# Patient Record
Sex: MALE | Race: WHITE | NOT HISPANIC OR LATINO | Employment: OTHER | URBAN - METROPOLITAN AREA
[De-identification: names, ages, dates, MRNs, and addresses within clinical notes are randomized per-mention and may not be internally consistent; named-entity substitution may affect disease eponyms.]

---

## 2024-08-08 ENCOUNTER — DOCUMENTATION ONLY (OUTPATIENT)
Dept: TRANSPLANT | Facility: CLINIC | Age: 64
End: 2024-08-08

## 2024-08-08 ENCOUNTER — TELEPHONE (OUTPATIENT)
Dept: TRANSPLANT | Facility: CLINIC | Age: 64
End: 2024-08-08

## 2024-08-08 NOTE — PROGRESS NOTES
"Received this Email from Fan:  \"At this point I would like to donate only to Carlos.  Will you keep us informed on progress on finding a donor or should I talk to Carlos?   If no donor is found for him, I will reconsider the voucher program.\"  Informed Fan we will have him on BACKUP list and will contact him when/if we want to begin testing.    "

## 2024-08-08 NOTE — TELEPHONE ENCOUNTER
"Donor Intake Start:24Donor Intake Complete:24  Expiration Date:10/29/24  Gender:MalePreferred Language:English  Full Name:Fan Ho  Needed:[not answered]  Preferred Name:Valdez  Phone Number:[phone] Phone:  Contact Preference:[not answered]Best Contact Time:11am - 1pm  Emergency Contact:Gretchen Tyler Contact #:[phone]  Relationship to Contact:Contact is my spouse  :62Age:63  Country:[place]  [address] [address]  State:[place]:[id number]  Height:6'0\"Weight:184lbs  BMI:25  Employment Status:RetiredHas PTO for donation?[not answered]  Occupation:[not answered]Requires Heavy Lifting?[not answered]  Education Level:High SchoolMarital Status:  Exercise Routine:2-3/WeekHealth Insurance:  Yes  Blood Type:OEthnicity/Race:White  Donor Type:Directed Donor  Prefer Remote Donation:[not answered]  Physician:Dr. Amandeep [place], [place]  Motivation to donate:  Carlos is a good friend and I want to help him.  Living Donor Pre-Screening  Is In [place]  Yes  Will Accept Blood Transfusions?  Yes  Has been Diagnosed with Kidney Disease?  No  Has had a Heart Attack?  No  Has Diabetes?  No  Has had Cancer?  No  Has had Kidney Stones?  No  Has Used Tobacco  No  Has HIV?  No  Is Currently Incarcerated?  No  Is Currently Residing in [place]  Yes  History Misc  Has Allergies?  Yes  Allergy  Shellfish  Has had Surgeries?  Yes  Surgery When  Shoulder 5 years ago  Takes Medication?  No  Medical History  History of High BP?  Never  Has History Of CABG (bypass surgery)?  No  History of Blood Clots?  Never  History of Coronary Disease?  Never  Has Stents Implanted?  No  Has History of Chest Pain with Exercise?  No  Has History of Chest Pain at Other Times?  No  Results of Climbing 2 Flights of Stairs?No Problem  Has had Stress Test within Last Year?  No  Has had Stroke?  No  Has had Leg Bypass?  No  History of Lung Disease?  Never  History of " COPD?  Never  History of TB?  Never    - Is TB Active?[not answered]  History of Pneumonia?  Never  Has Respiratory Issues?  No  Has Gastro Issues?  No  History of Gallstones?  Never  History of Pancreatitis?  Never  History of Liver Disease?  Never  History of Hepatitis B?  Never    - Is Hep B Active?[not answered]  History of Hepatitis C?  Never  History of Bleeding Problem?  Never  History of UTIs?  No  History of Kidney Damage?  Never  History of Proteinuria?  Never  History of Hematuria?  Never  History of Neuro Disease?  Never  History of Seizure?  Never  History of Lupus?  Never  History of Paralysis?  Never  History of Arthritis?  Never  History of Neuropathy?  Never  History of Depression?  Never  History of Anxiety?  Never  History of Documented Psychiatric Illness?  Never  Has had Transfusions?  No  History of Obesity?  No  History of Fabry's Disease?  No  History of Sickle Cell Disease?  No  History of Sickle Cell Trait?  No  History of Sarcoidosis?  No  History of Auto-Immune Disease  No  Has had Physical Exam?  Yes    - how many years ago:1  Has had PSA Test?  Yes    - how many years ago:1  Has had Colonoscopy?  Yes    - How Many Years Ago:4  Medical History Comments?N/A  Living Donor Family Medical History  Anyone with kidney disease?  No  Anyone with liver disease?  No  Anyone with heart disease?  Yes    - which family members:father  Anyone with coronary artery disease?  No  Anyone with high blood pressure?  Yes    - which family members:father  Anyone with blood disorder?  No  Anyone with cancer?  Yes    - which family members:Sister  Anyone with kidney cancer?  No  Anyone with diabetes?  No  Is mother alive?  Yes  Mother's age?84  Is father alive?  Yes  Father's age?86  How many siblings?3  How many adult children?6  How many children under 18?0  Social History  Has Used Alcohol?  Yes    - currently uses alcohol:  Yes    - how much:1/Weekly  Has Abused Alcohol?  No  Has Used Drugs?  No  Has had  legal issues w/ law enforcement?  No  Traveled over 100 miles from home in last year?  Yes    - Traveled Where?[place]  Has had suicidal thoughts or attempts in the last five years?  No  [facility] Terms & Conditions

## 2024-09-05 ENCOUNTER — DOCUMENTATION ONLY (OUTPATIENT)
Dept: TRANSPLANT | Facility: CLINIC | Age: 64
End: 2024-09-05

## 2024-09-05 NOTE — PROGRESS NOTES
Attempted to call Valdez to see if he'd like to cont process.Left VM and sent Email message.Would like to sched BURT call.

## 2024-09-06 ENCOUNTER — DOCUMENTATION ONLY (OUTPATIENT)
Dept: TRANSPLANT | Facility: CLINIC | Age: 64
End: 2024-09-06

## 2024-09-10 ENCOUNTER — TELEPHONE (OUTPATIENT)
Dept: TRANSPLANT | Facility: CLINIC | Age: 64
End: 2024-09-10

## 2024-09-10 NOTE — TELEPHONE ENCOUNTER
Initial Independent Living Donor Advocate contact made with potential donor today.  I introduced myself and my role during the donation process, including:   BURT ROLE   The federal government requires that all licensed transplant centers provide the living donor with an Independent Living Donor Advocate (BURT).  I do not meet recipients or attend meetings that discuss their care or decision to transplant them. My role is separate to avoid any conflict of interest.  My role is to ensure:  1) your rights are protected;  2) you get all the information you need from the transplant team to make a fully informed decision whether to donate;   3) that living donation is in your best interest.   4) that you have the right to decide NOT to go forward with living donation at any time during this process.  I am available to you throughout the workup, during surgery phase and follow-up at home.     WORKUP & PRIVACY   Your identity and workup are not shared with the recipient at any time.   The recipient's insurance covers the medical expenses related to the donor evaluation and surgery.  However, it is important that you carry your own health insurance to address any medical issues that are found and are NOT related to living donation.  Additionally, age appropriate cancer screening (I.e. mammograms,  colonoscopies, etc) is required and would be through your insurance.  There is a psychosocial and medical donor workup that consists of testing to determine if you are healthy enough to donate. Workup tests include tissue typing/genetics, many blood draws, urine collection/ (kidney function testing), chest x-ray, EKG/other heart testing, CT scan. Age appropriate cancer screening is required and would be through your insurance. As you complete each step then you may move on to the next.  Workup can take as little or as long as you need and you can stop the process at any time. Transplant is a treatment option, not a cure. A kidney  from a living kidney donor can last 12-14 years.  Other treatment options are  donation and two types of dialysis.   This is major surgery and your estimated hospital stay is approximately 1-2 nights.  After surgery, there are driving and lifting restrictions - no driving for two weeks and no lifting over ten pounds for 8 weeks.  Donors are routinely off from work for 4 - 6 weeks after surgery, and potentially longer if they have a physical job.     If you anticipate lost wages due to donation, donor wage reimbursement options may be available to you and will be reviewed with you during the evaluation process. Donor Shield and NLDAC explained.  We reviewed the importance of completing follow-up labs and surveys at six months, 1 year and 2 years after donation to monitor kidney health and the impact donation has had on their life post donation.        QUESTIONS    Have you received the Welcome e-mail that includes copies of the informed consent, financial letter, information on donor shield and NLDAC from the transplant department? Yes.    Have you discussed with anyone your potential decision to donate?   Yes.    Is anyone pressuring or coercing you to donate? No.    Have you discussed any financial arrangements with recipient around donating a kidney? No.    Are you aware that you can confidentially opt out at any time, up to and including day of donation? Yes.    At this time, would you like to proceed with the medical evaluation to see if you can be a kidney donor?  Yes.    If yes, I will make an appointment for your donor coordinator to reach out to you with next steps.     Contact information for BURT's was provided Yes.    Demographic Information:   Preferred Name: Valdez   Preferred Pronouns: he/him   Race/Ethnicity: white    Viktoria Vásquez- [phone]  Melissa White-  [phone]    Confirmed that he reviewed Informed consent document and all questions answered.  Reviewed that they will receive  Docusign to obtain electronic signature for the following: Informed consent, SRTR data, EVELINE for medical information, Auth for Electronic communication, Kidney for Life and will need their signed consent back before proceeding with evaluation.     Time frame for donation: open  Paired exchange was introduced; only wants direct donation at this time.     MyChart was initiated  Yes.  CareEverywhere was initiated Yes.    BURT NOTES: Valdez wants to donate to a friend that he has known for 25 years--they are family friends and kids are friends. Lives in [facility] with wife. 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 Only wants direct donation at this time. He is very active. Scheduled to talk to Enedina Sharp on 9/17 at 12:15pm     ANDREW Khalil, CCTSW   Independent Living Donor Advocate  [facility]  Direct: [phone]  E-Mail: [email or web address]        Duration of call 30 minutes

## 2024-09-17 ENCOUNTER — DOCUMENTATION ONLY (OUTPATIENT)
Dept: TRANSPLANT | Facility: CLINIC | Age: 64
End: 2024-09-17

## 2024-09-17 ENCOUNTER — TELEPHONE (OUTPATIENT)
Dept: TRANSPLANT | Facility: CLINIC | Age: 64
End: 2024-09-17

## 2024-09-17 NOTE — TELEPHONE ENCOUNTER
Contacted Fan Lopez to introduce myself and my role, review of medical/surgical/family history and next steps.     Fan Lopez  is aware He can stop donor evaluation at any time.    Regular blood donor? Yes Last blood donation date 8/2/24 (Notified donor to avoid blood donation at this time to get accurate blood counts if going through evaluation)     Fan Lopez is a 63 year old male  ABO O that would like to learn more about being a donor to his friend Carlos Mann. Open to paired exchange: not at this time, would reconsider if no other donor identified.      Concerns from medical/surgical/family history:     SARABJIT- dx with mild, tried CPAP but unable to tolerate so not using     Tolerates very active lifestyle- completed 1/2 marathon in April and has done other races this summer.     Current medications and NSAID use:   ASA 81mg- takes for heart health- PCP Valdez martini willing to stop if needed  Vitamin     Allergies reviewed: Shellfish     Legal issues w/ law enforcement: none    Reviewed any history of travel in endemic areas: [place]  Strongyloides- [place], [place] and [place].  Trypanosoma cruzi ([place])- [place]  West Nile Virus- [place], [place], [place], [place] and [place]. (Included in GIOVANNY testing prior to donation)    Per our Phase 1 algorithm, does meet criteria to do preliminary testing. Social work screening not needed.      Verified that potential donor was provided the informed consent DocuSign and they are comfortable moving forward to living donor evaluation.    Reviewed evaluation testing: Iohexol, Lab work, CXR, EKG, Provider visits and functions, CT Angiogram.     Reviewed operations of selection committee and angio review meetings and the need for multidisciplinary input. Post-donation requirements include post-op appointment with your surgeon at 2 weeks after surgery, 6 week, 6 month, 1 year and 2 year lab tests.     Valdez would like to proceed with  next steps: P1 testing and tissue typing.      Encouraged sign up for MyChart and reviewed importance of watching teaching videos prior to evaluation.    Verified recipient status if not NDD.    Donor timeline: tbd     Will send orders to scheduling team to set up for P1 testing.

## 2024-10-09 ENCOUNTER — DOCUMENTATION ONLY (OUTPATIENT)
Dept: TRANSPLANT | Facility: CLINIC | Age: 64
End: 2024-10-09

## 2024-10-09 NOTE — PROGRESS NOTES
Sent Email message to Valdez asking if he's done the P1's yet. To let me know if still interested or if he's changed his mind.

## 2024-10-11 ENCOUNTER — DOCUMENTATION ONLY (OUTPATIENT)
Dept: TRANSPLANT | Facility: CLINIC | Age: 64
End: 2024-10-11

## 2024-10-11 NOTE — PROGRESS NOTES
"Received this Email in response to question re:if P1's were done:  \"I had the labs done on 10/1.   You have not received the results?   I am still interested.      Valdez\"    Told him no we don't have results. Asked him to contact wherever he went and fax us the results.    "

## 2024-10-15 ENCOUNTER — TELEPHONE (OUTPATIENT)
Dept: TRANSPLANT | Facility: CLINIC | Age: 64
End: 2024-10-15

## 2024-10-15 NOTE — TELEPHONE ENCOUNTER
P1 labs only received and reviewed. Call made to Fan to recommend he follow up with his PCP regarding Hgb and GFR, REJI left.

## 2024-10-15 NOTE — TELEPHONE ENCOUNTER
Return call received from Valdez. Advised he follow up with his PCP regarding P1 results. He verbalized understanding. Encouraged him to call with questions or updates.